# Patient Record
Sex: MALE | Race: WHITE | NOT HISPANIC OR LATINO | Employment: OTHER | URBAN - METROPOLITAN AREA
[De-identification: names, ages, dates, MRNs, and addresses within clinical notes are randomized per-mention and may not be internally consistent; named-entity substitution may affect disease eponyms.]

---

## 2022-06-09 ENCOUNTER — HOSPITAL ENCOUNTER (OUTPATIENT)
Dept: LAB | Facility: MEDICAL CENTER | Age: 77
End: 2022-06-09
Attending: INTERNAL MEDICINE
Payer: MEDICARE

## 2022-06-09 LAB
CFT BLD TEG: 9 MIN (ref 4.6–9.1)
CFT P HPASE BLD TEG: 10.7 MIN (ref 4.3–8.3)
CLOT ANGLE BLD TEG: 71.9 DEGREES (ref 63–78)
CT.EXTRINSIC BLD ROTEM: 1.3 MIN (ref 0.8–2.1)
FACT IX ACT/NOR PPP: 109 % (ref 75–125)
INHIBITOR INDICATED 1863: YES
MCF BLD TEG: 60.6 MM (ref 52–69)
MCF.PLATELET INHIB BLD ROTEM: 22.9 MM (ref 15–32)
PATH REV: NORMAL
PATH REV: NORMAL
TEG ALGORITHM TGALG: ABNORMAL

## 2022-06-09 PROCEDURE — 80503 PATH CLIN CONSLTJ SF 5-20: CPT | Mod: XU

## 2022-06-09 PROCEDURE — 36415 COLL VENOUS BLD VENIPUNCTURE: CPT

## 2022-06-09 PROCEDURE — 85240 CLOT FACTOR VIII AHG 1 STAGE: CPT

## 2022-06-09 PROCEDURE — 85384 FIBRINOGEN ACTIVITY: CPT

## 2022-06-09 PROCEDURE — 85610 PROTHROMBIN TIME: CPT | Mod: 91

## 2022-06-09 PROCEDURE — 85670 THROMBIN TIME PLASMA: CPT

## 2022-06-09 PROCEDURE — 85347 COAGULATION TIME ACTIVATED: CPT

## 2022-06-09 PROCEDURE — 85732 THROMBOPLASTIN TIME PARTIAL: CPT

## 2022-06-09 PROCEDURE — 85597 PHOSPHOLIPID PLTLT NEUTRALIZ: CPT

## 2022-06-09 PROCEDURE — 85250 CLOT FACTOR IX PTC/CHRSTMAS: CPT

## 2022-06-09 PROCEDURE — 85576 BLOOD PLATELET AGGREGATION: CPT

## 2022-06-09 PROCEDURE — 85613 RUSSELL VIPER VENOM DILUTED: CPT | Mod: 91

## 2022-06-10 LAB
FACT VIII ACT/NOR PPP: 82 % (ref 45–145)
INHIBITOR INDICATED 1863: YES
PATH REV: NORMAL
PATH REV: NORMAL

## 2022-06-10 PROCEDURE — 80503 PATH CLIN CONSLTJ SF 5-20: CPT

## 2022-06-15 LAB
APTT IMM NP PPP: 77 SEC (ref 32–48)
CONFIRM APTT STACLOT: ABNORMAL
DRVVT SCREEN TO CONFIRM RATIO: POSITIVE RATIO
LA 3 SCREEN W REFLEX-IMP: ABNORMAL
LA NT PLATELET PPP: POSITIVE S
MIXING DRVVT: 82 SEC (ref 33–44)
PROTHROMBIN TIME: 13.5 SEC (ref 12–15.5)
PT P HEP NEUT PPP: ABNORMAL SEC (ref 32–48)
REPTILASE TIME: ABNORMAL SEC
SCREEN APTT: 105 SEC (ref 32–48)
SCREEN DRVVT: 122 SEC (ref 33–44)
THROMBIN TIME: 16.8 SEC (ref 14.7–19.5)

## 2022-06-17 LAB
APTT 1H NP PPP: 50.1 SEC (ref 24.7–36)
APTT 1H P INC POOL PPP: 27.9 SEC (ref 24.7–36)
APTT 1H P INC PPP: 64.9 SEC (ref 24.7–36)
APTT IMM NP PPP: 49.2 SEC (ref 24.7–36)
APTT POOL PPP: 28.1 SEC (ref 24.7–36)
APTT PPP: 60.1 SEC (ref 24.7–36)

## 2024-11-20 ENCOUNTER — HOSPITAL ENCOUNTER (OUTPATIENT)
Facility: MEDICAL CENTER | Age: 79
End: 2024-11-20
Payer: COMMERCIAL

## 2024-11-20 ENCOUNTER — HOSPITAL ENCOUNTER (OUTPATIENT)
Dept: LAB | Facility: MEDICAL CENTER | Age: 79
End: 2024-11-20
Payer: MEDICARE

## 2024-11-20 ENCOUNTER — OFFICE VISIT (OUTPATIENT)
Dept: MEDICAL GROUP | Facility: MEDICAL CENTER | Age: 79
End: 2024-11-20
Payer: MEDICARE

## 2024-11-20 VITALS
SYSTOLIC BLOOD PRESSURE: 122 MMHG | HEART RATE: 83 BPM | WEIGHT: 209.2 LBS | HEIGHT: 70 IN | BODY MASS INDEX: 29.95 KG/M2 | TEMPERATURE: 97.8 F | OXYGEN SATURATION: 98 % | DIASTOLIC BLOOD PRESSURE: 60 MMHG

## 2024-11-20 DIAGNOSIS — G47.00 INSOMNIA, UNSPECIFIED TYPE: ICD-10-CM

## 2024-11-20 DIAGNOSIS — M25.551 CHRONIC PAIN OF BOTH HIPS: ICD-10-CM

## 2024-11-20 DIAGNOSIS — E11.65 TYPE 2 DIABETES MELLITUS WITH HYPERGLYCEMIA, WITHOUT LONG-TERM CURRENT USE OF INSULIN (HCC): ICD-10-CM

## 2024-11-20 DIAGNOSIS — H40.9 GLAUCOMA OF BOTH EYES, UNSPECIFIED GLAUCOMA TYPE: ICD-10-CM

## 2024-11-20 DIAGNOSIS — E78.5 DYSLIPIDEMIA: ICD-10-CM

## 2024-11-20 DIAGNOSIS — G62.9 NEUROPATHY: ICD-10-CM

## 2024-11-20 DIAGNOSIS — E66.9 OBESITY (BMI 30-39.9): ICD-10-CM

## 2024-11-20 DIAGNOSIS — N18.32 STAGE 3B CHRONIC KIDNEY DISEASE: ICD-10-CM

## 2024-11-20 DIAGNOSIS — Z74.09 IMPAIRED MOBILITY: ICD-10-CM

## 2024-11-20 DIAGNOSIS — Z76.89 ENCOUNTER TO ESTABLISH CARE: ICD-10-CM

## 2024-11-20 DIAGNOSIS — G89.29 BILATERAL CHRONIC KNEE PAIN: ICD-10-CM

## 2024-11-20 DIAGNOSIS — F43.10 PTSD (POST-TRAUMATIC STRESS DISORDER): ICD-10-CM

## 2024-11-20 DIAGNOSIS — M25.552 CHRONIC PAIN OF BOTH HIPS: ICD-10-CM

## 2024-11-20 DIAGNOSIS — Z85.820 HISTORY OF MELANOMA: ICD-10-CM

## 2024-11-20 DIAGNOSIS — G89.29 CHRONIC PAIN OF BOTH HIPS: ICD-10-CM

## 2024-11-20 DIAGNOSIS — I10 PRIMARY HYPERTENSION: ICD-10-CM

## 2024-11-20 DIAGNOSIS — M25.562 BILATERAL CHRONIC KNEE PAIN: ICD-10-CM

## 2024-11-20 DIAGNOSIS — F51.5 NIGHTMARES: ICD-10-CM

## 2024-11-20 DIAGNOSIS — M25.561 BILATERAL CHRONIC KNEE PAIN: ICD-10-CM

## 2024-11-20 DIAGNOSIS — F33.0 MILD EPISODE OF RECURRENT MAJOR DEPRESSIVE DISORDER (HCC): ICD-10-CM

## 2024-11-20 DIAGNOSIS — R01.1 MURMUR, CARDIAC: ICD-10-CM

## 2024-11-20 DIAGNOSIS — M19.90 ARTHRITIS: ICD-10-CM

## 2024-11-20 PROBLEM — M48.062 LUMBAR STENOSIS WITH NEUROGENIC CLAUDICATION: Status: ACTIVE | Noted: 2022-11-07

## 2024-11-20 PROBLEM — M54.42 CHRONIC BILATERAL LOW BACK PAIN WITH BILATERAL SCIATICA: Status: ACTIVE | Noted: 2022-05-26

## 2024-11-20 PROBLEM — M54.41 CHRONIC BILATERAL LOW BACK PAIN WITH BILATERAL SCIATICA: Status: ACTIVE | Noted: 2022-05-26

## 2024-11-20 PROBLEM — C43.9 MELANOMA OF SKIN (HCC): Status: ACTIVE | Noted: 2023-05-03

## 2024-11-20 LAB
ALBUMIN SERPL BCP-MCNC: 4.1 G/DL (ref 3.2–4.9)
ALBUMIN/GLOB SERPL: 1.3 G/DL
ALP SERPL-CCNC: 72 U/L (ref 30–99)
ALT SERPL-CCNC: 8 U/L (ref 2–50)
ANION GAP SERPL CALC-SCNC: 15 MMOL/L (ref 7–16)
AST SERPL-CCNC: 18 U/L (ref 12–45)
BILIRUB SERPL-MCNC: 0.6 MG/DL (ref 0.1–1.5)
BUN SERPL-MCNC: 38 MG/DL (ref 8–22)
CALCIUM ALBUM COR SERPL-MCNC: 9.7 MG/DL (ref 8.5–10.5)
CALCIUM SERPL-MCNC: 9.8 MG/DL (ref 8.5–10.5)
CHLORIDE SERPL-SCNC: 105 MMOL/L (ref 96–112)
CHOLEST SERPL-MCNC: 139 MG/DL (ref 100–199)
CO2 SERPL-SCNC: 20 MMOL/L (ref 20–33)
CREAT SERPL-MCNC: 1.92 MG/DL (ref 0.5–1.4)
ERYTHROCYTE [DISTWIDTH] IN BLOOD BY AUTOMATED COUNT: 51.3 FL (ref 35.9–50)
GFR SERPLBLD CREATININE-BSD FMLA CKD-EPI: 35 ML/MIN/1.73 M 2
GLOBULIN SER CALC-MCNC: 3.1 G/DL (ref 1.9–3.5)
GLUCOSE SERPL-MCNC: 60 MG/DL (ref 65–99)
HBA1C MFR BLD: 5.4 % (ref ?–5.8)
HCT VFR BLD AUTO: 39.2 % (ref 42–52)
HDLC SERPL-MCNC: 44 MG/DL
HGB BLD-MCNC: 12.8 G/DL (ref 14–18)
LDLC SERPL CALC-MCNC: 67 MG/DL
MCH RBC QN AUTO: 29 PG (ref 27–33)
MCHC RBC AUTO-ENTMCNC: 32.7 G/DL (ref 32.3–36.5)
MCV RBC AUTO: 88.9 FL (ref 81.4–97.8)
PLATELET # BLD AUTO: 276 K/UL (ref 164–446)
PMV BLD AUTO: 11.3 FL (ref 9–12.9)
POCT INT CON NEG: NEGATIVE
POCT INT CON POS: POSITIVE
POTASSIUM SERPL-SCNC: 4.3 MMOL/L (ref 3.6–5.5)
PROT SERPL-MCNC: 7.2 G/DL (ref 6–8.2)
RBC # BLD AUTO: 4.41 M/UL (ref 4.7–6.1)
SODIUM SERPL-SCNC: 140 MMOL/L (ref 135–145)
TRIGL SERPL-MCNC: 141 MG/DL (ref 0–149)
TSH SERPL DL<=0.005 MIU/L-ACNC: 3.56 UIU/ML (ref 0.38–5.33)
WBC # BLD AUTO: 9.5 K/UL (ref 4.8–10.8)

## 2024-11-20 PROCEDURE — 80061 LIPID PANEL: CPT

## 2024-11-20 PROCEDURE — 84443 ASSAY THYROID STIM HORMONE: CPT

## 2024-11-20 PROCEDURE — 83036 HEMOGLOBIN GLYCOSYLATED A1C: CPT

## 2024-11-20 PROCEDURE — 85027 COMPLETE CBC AUTOMATED: CPT

## 2024-11-20 PROCEDURE — 99214 OFFICE O/P EST MOD 30 MIN: CPT

## 2024-11-20 PROCEDURE — 82570 ASSAY OF URINE CREATININE: CPT

## 2024-11-20 PROCEDURE — 80053 COMPREHEN METABOLIC PANEL: CPT

## 2024-11-20 PROCEDURE — 36415 COLL VENOUS BLD VENIPUNCTURE: CPT

## 2024-11-20 PROCEDURE — 3078F DIAST BP <80 MM HG: CPT

## 2024-11-20 PROCEDURE — 92250 FUNDUS PHOTOGRAPHY W/I&R: CPT | Mod: 26

## 2024-11-20 PROCEDURE — 82043 UR ALBUMIN QUANTITATIVE: CPT

## 2024-11-20 PROCEDURE — 3074F SYST BP LT 130 MM HG: CPT

## 2024-11-20 RX ORDER — GLIPIZIDE 5 MG/1
10 TABLET ORAL 2 TIMES DAILY
Qty: 360 TABLET | Refills: 3 | Status: SHIPPED | OUTPATIENT
Start: 2024-11-20 | End: 2025-11-20

## 2024-11-20 RX ORDER — PRAZOSIN HYDROCHLORIDE 1 MG/1
CAPSULE ORAL
COMMUNITY
Start: 2024-09-09 | End: 2024-11-20 | Stop reason: SDUPTHER

## 2024-11-20 RX ORDER — TRAZODONE HYDROCHLORIDE 150 MG/1
TABLET ORAL
COMMUNITY
Start: 2024-10-10 | End: 2024-11-20 | Stop reason: SDUPTHER

## 2024-11-20 RX ORDER — GLIPIZIDE 5 MG/1
TABLET ORAL
COMMUNITY
Start: 2024-10-10 | End: 2024-11-20 | Stop reason: SDUPTHER

## 2024-11-20 RX ORDER — TRAZODONE HYDROCHLORIDE 150 MG/1
150 TABLET ORAL NIGHTLY
Qty: 90 TABLET | Refills: 3 | Status: SHIPPED | OUTPATIENT
Start: 2024-11-20 | End: 2025-11-20

## 2024-11-20 RX ORDER — PRAVASTATIN SODIUM 20 MG
TABLET ORAL
COMMUNITY
Start: 2024-10-10 | End: 2024-11-20 | Stop reason: SDUPTHER

## 2024-11-20 RX ORDER — PRAVASTATIN SODIUM 20 MG
20 TABLET ORAL NIGHTLY
Qty: 90 TABLET | Refills: 3 | Status: SHIPPED | OUTPATIENT
Start: 2024-11-20 | End: 2025-11-20

## 2024-11-20 RX ORDER — GABAPENTIN 300 MG/1
300 CAPSULE ORAL NIGHTLY
Qty: 90 CAPSULE | Refills: 3 | Status: SHIPPED | OUTPATIENT
Start: 2024-11-20 | End: 2025-11-20

## 2024-11-20 RX ORDER — PRAZOSIN HYDROCHLORIDE 1 MG/1
1 CAPSULE ORAL NIGHTLY
Qty: 90 CAPSULE | Refills: 3 | Status: SHIPPED | OUTPATIENT
Start: 2024-11-20 | End: 2025-11-20

## 2024-11-20 RX ORDER — LISINOPRIL AND HYDROCHLOROTHIAZIDE 12.5; 2 MG/1; MG/1
2 TABLET ORAL DAILY
Qty: 180 TABLET | Refills: 3 | Status: SHIPPED | OUTPATIENT
Start: 2024-11-20 | End: 2025-11-20

## 2024-11-20 RX ORDER — LISINOPRIL AND HYDROCHLOROTHIAZIDE 12.5; 2 MG/1; MG/1
TABLET ORAL
COMMUNITY
Start: 2024-10-10 | End: 2024-11-20 | Stop reason: SDUPTHER

## 2024-11-20 RX ORDER — GABAPENTIN 300 MG/1
300 CAPSULE ORAL NIGHTLY
COMMUNITY
Start: 2024-06-10 | End: 2024-11-20 | Stop reason: SDUPTHER

## 2024-11-20 ASSESSMENT — PATIENT HEALTH QUESTIONNAIRE - PHQ9
4. FEELING TIRED OR HAVING LITTLE ENERGY: NEARLY EVERY DAY
SUM OF ALL RESPONSES TO PHQ QUESTIONS 1-9: 7
5. POOR APPETITE OR OVEREATING: NOT AT ALL
9. THOUGHTS THAT YOU WOULD BE BETTER OFF DEAD, OR OF HURTING YOURSELF: NOT AT ALL
7. TROUBLE CONCENTRATING ON THINGS, SUCH AS READING THE NEWSPAPER OR WATCHING TELEVISION: SEVERAL DAYS
8. MOVING OR SPEAKING SO SLOWLY THAT OTHER PEOPLE COULD HAVE NOTICED. OR THE OPPOSITE, BEING SO FIGETY OR RESTLESS THAT YOU HAVE BEEN MOVING AROUND A LOT MORE THAN USUAL: NOT AT ALL
3. TROUBLE FALLING OR STAYING ASLEEP OR SLEEPING TOO MUCH: NEARLY EVERY DAY
2. FEELING DOWN, DEPRESSED, IRRITABLE, OR HOPELESS: NOT AT ALL
CLINICAL INTERPRETATION OF PHQ2 SCORE: 0
6. FEELING BAD ABOUT YOURSELF - OR THAT YOU ARE A FAILURE OR HAVE LET YOURSELF OR YOUR FAMILY DOWN: NOT AL ALL
1. LITTLE INTEREST OR PLEASURE IN DOING THINGS: NOT AT ALL
SUM OF ALL RESPONSES TO PHQ9 QUESTIONS 1 AND 2: 0

## 2024-11-20 ASSESSMENT — ENCOUNTER SYMPTOMS
FEVER: 0
COUGH: 0
PALPITATIONS: 0
ORTHOPNEA: 0
SHORTNESS OF BREATH: 0
CHILLS: 0
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
## 2024-11-21 LAB
CREAT UR-MCNC: 107.49 MG/DL
MICROALBUMIN UR-MCNC: <1.2 MG/DL
MICROALBUMIN/CREAT UR: NORMAL MG/G (ref 0–30)

## 2024-11-25 ENCOUNTER — TELEPHONE (OUTPATIENT)
Dept: HEALTH INFORMATION MANAGEMENT | Facility: OTHER | Age: 79
End: 2024-11-25
Payer: COMMERCIAL

## 2024-12-05 ENCOUNTER — APPOINTMENT (OUTPATIENT)
Dept: PHYSICAL MEDICINE AND REHAB | Facility: MEDICAL CENTER | Age: 79
End: 2024-12-05
Payer: COMMERCIAL

## 2024-12-05 DIAGNOSIS — M25.512 CHRONIC PAIN OF BOTH SHOULDERS: ICD-10-CM

## 2024-12-05 DIAGNOSIS — M17.12 PRIMARY OSTEOARTHRITIS OF LEFT KNEE: Primary | ICD-10-CM

## 2024-12-05 DIAGNOSIS — M25.552 BILATERAL HIP PAIN: ICD-10-CM

## 2024-12-05 DIAGNOSIS — M25.50 POLYARTHRALGIA: ICD-10-CM

## 2024-12-05 DIAGNOSIS — M25.562 CHRONIC PAIN OF LEFT KNEE: ICD-10-CM

## 2024-12-05 DIAGNOSIS — M25.511 CHRONIC PAIN OF BOTH SHOULDERS: ICD-10-CM

## 2024-12-05 DIAGNOSIS — N18.32 STAGE 3B CHRONIC KIDNEY DISEASE: ICD-10-CM

## 2024-12-05 DIAGNOSIS — G89.29 CHRONIC PAIN OF BOTH SHOULDERS: ICD-10-CM

## 2024-12-05 DIAGNOSIS — G89.29 CHRONIC PAIN OF BOTH KNEES: ICD-10-CM

## 2024-12-05 DIAGNOSIS — M25.562 CHRONIC PAIN OF BOTH KNEES: ICD-10-CM

## 2024-12-05 DIAGNOSIS — M79.18 MYOFASCIAL PAIN: ICD-10-CM

## 2024-12-05 DIAGNOSIS — E11.65 TYPE 2 DIABETES MELLITUS WITH HYPERGLYCEMIA, WITHOUT LONG-TERM CURRENT USE OF INSULIN (HCC): ICD-10-CM

## 2024-12-05 DIAGNOSIS — M25.551 BILATERAL HIP PAIN: ICD-10-CM

## 2024-12-05 DIAGNOSIS — M25.561 CHRONIC PAIN OF BOTH KNEES: ICD-10-CM

## 2024-12-05 DIAGNOSIS — G89.29 CHRONIC PAIN OF LEFT KNEE: ICD-10-CM

## 2024-12-19 VITALS
DIASTOLIC BLOOD PRESSURE: 75 MMHG | TEMPERATURE: 97.9 F | HEIGHT: 70 IN | BODY MASS INDEX: 30.93 KG/M2 | WEIGHT: 216.05 LBS | SYSTOLIC BLOOD PRESSURE: 150 MMHG | HEART RATE: 82 BPM | OXYGEN SATURATION: 95 %

## 2024-12-19 PROCEDURE — 99204 OFFICE O/P NEW MOD 45 MIN: CPT | Performed by: STUDENT IN AN ORGANIZED HEALTH CARE EDUCATION/TRAINING PROGRAM

## 2024-12-19 PROCEDURE — 3078F DIAST BP <80 MM HG: CPT | Performed by: STUDENT IN AN ORGANIZED HEALTH CARE EDUCATION/TRAINING PROGRAM

## 2024-12-19 PROCEDURE — 1125F AMNT PAIN NOTED PAIN PRSNT: CPT | Performed by: STUDENT IN AN ORGANIZED HEALTH CARE EDUCATION/TRAINING PROGRAM

## 2024-12-19 PROCEDURE — 3077F SYST BP >= 140 MM HG: CPT | Performed by: STUDENT IN AN ORGANIZED HEALTH CARE EDUCATION/TRAINING PROGRAM

## 2024-12-19 RX ORDER — CYCLOBENZAPRINE HCL 5 MG
2.5 TABLET ORAL 3 TIMES DAILY PRN
Qty: 90 TABLET | Refills: 1 | Status: SHIPPED | OUTPATIENT
Start: 2024-12-19

## 2024-12-19 ASSESSMENT — PAIN SCALES - GENERAL: PAINLEVEL_OUTOF10: 8=MODERATE-SEVERE PAIN

## 2024-12-19 ASSESSMENT — PATIENT HEALTH QUESTIONNAIRE - PHQ9
5. POOR APPETITE OR OVEREATING: 1 - SEVERAL DAYS
CLINICAL INTERPRETATION OF PHQ2 SCORE: 3
SUM OF ALL RESPONSES TO PHQ QUESTIONS 1-9: 16

## 2024-12-19 ASSESSMENT — FIBROSIS 4 INDEX: FIB4 SCORE: 1.82

## 2024-12-19 NOTE — PATIENT INSTRUCTIONS
Overview    What is a genicular nerve block?  A genicular nerve block is an injection of medication close to certain nerves in your knee to provide temporary pain relief. Some injections provide prolonged pain relief. An injection of steroid medication combined with local anesthetic that may help with pain.    Your knee joints function with the help of several branches of the following nerves:    Femoral nerve.  Common peroneal nerve.  Saphenous nerve.  Tibial nerve.  Obturator nerve.  These branches around your knee joint are known as genicular nerves. “Genicular” means “relating to the knee.” Genicular nerves provide sensory innervation to your knee and include the:    Superolateral genicular nerve (SLGN).  Superomedial genicular nerve (SMGN).  Inferomedial genicular nerve (IMGN).  Inferolateral genicular nerve (ILGN).  Healthcare providers can safely target all of these nerves for a genicular nerve block except for the ILGN. This is because the ILGN is too close to your peroneal nerve.    What is a genicular nerve block used for?  A genicular nerve block has two main purposes:    To alleviate chronic knee pain: If you have chronic knee pain that hasn’t responded to conservative treatments, like physical therapy, NSAIDs or steroid knee joint injections, your healthcare provider may recommend a genicular nerve block. It may provide pain relief. Chronic knee pain can have several possible causes, such as injury and knee osteoarthritis.  To alleviate moderate to severe postoperative knee pain: If you’re having a knee surgery, such as a total knee arthroplasty (knee replacement), your anesthesiologist or surgeon may give you a genicular nerve block before the surgery to manage pain after the surgery. They use this in addition to general anesthesia and other peripheral nerve blocks.    Procedure Details  How should I prepare for a genicular nerve block?  You usually don’t have to do anything special to prepare for a  genicular nerve block.    In some cases, your healthcare provider may recommend sedation for the procedure. If you’re receiving sedation, you’ll need to fast for six to eight hours before it. You’ll also need someone else to drive you home after the procedure.    In any case, your healthcare provider will let you know what to do. Be sure to follow their instructions. Don’t hesitate to ask questions.    What happens during a genicular nerve block?  Healthcare providers typically perform genicular nerve blocks for pain management in an outpatient setting. This means you’re not admitted to a hospital for the procedure and can go home shortly after it.    In general, you can expect the following when you receive a genicular nerve block:    You’ll lie on your back on a procedure table. A provider will put a small pillow under your affected knee to prop it up slightly.  You may receive a mild sedative through an IV line in your arm to help you relax.  The provider will clean the skin on your knee with an antiseptic solution. They’ll give you an injection of a local anesthetic to numb the area where you’ll receive the nerve block. You may still feel a pinch or some discomfort as the needle enters your skin.  The provider may use imaging guidance, such as ultrasound or fluoroscopy, to locate the exact spot where the injection needs to go. They’ll then inject the medication as close to the affected nerve(s) as possible.  After the procedure, you’ll rest until the medication takes effect.  The procedure takes about five to 10 minutes to complete.    What happens after a genicular nerve block?  After the injection, you’ll rest for 15 to 30 minutes to let the medication take effect. A nurse will also observe you during this time to make sure you don’t have any unexpected side effects. You’ll then be able to go home.    Risks / Benefits  What are the benefits of a genicular nerve block?  Potential benefits of a genicular nerve  block include:    Temporary or permanent pain relief, which may help you function better day to day.  Temporary or permanent reduction of inflammation in the affected nerves, which may help them heal.  Temporary pain relief after knee surgery.  A pain relief option for people who can’t undergo knee surgery due to health issues.  Some people may feel relief in a few hours, or it may take a couple of weeks for the steroid to take effect. It’s important to note that not everyone experiences pain relief from nerve blocks. You may need to try other treatment options if this is the case.    If you experience effective pain relief following a genicular nerve block, your healthcare provider may recommend genicular nerve ablation (GNA) to treat your knee pain. GNAs typically provide pain relief for about 18 months.    What are the risks or possible complications of a genicular nerve block?  You may have some soreness and slight bruising at the site of the injection, but this is usually mild and should go away within a couple of days.    Complications of genicular nerve blocks are rare. But, in general, risks and complications of nerve blocks include:    Infection at the injection site.  Bleeding at the injection site.  Accidental delivery of the medication into your bloodstream.  Accidental delivery of the medication into your knee joint.  Accidental delivery of the medication to your common peroneal nerve, which could cause foot drop.  Recovery and [place]  Can you walk after a genicular nerve block?  Yes, you should be able to walk out of the room after the genicular nerve block injection. It’s a good idea to take it easy for the rest of the day, but you can return to your normal activities.    How long does a genicular nerve block last?  Pain relief from a genicular nerve block can vary significantly. It may last a few days, several weeks, months or even years. But, on average, it lasts about three months. Each person  responds differently. Some people don’t experience any pain relief.    When To Call the Doctor  When should I call my healthcare provider?  Contact your healthcare provider immediately if you experience any new symptoms or complications from the genicular nerve block, such as an infection or nerve issues like burning pain, weakness or tingling.    A note from [facility]    Genicular nerve blocks can help treat chronic pain in your knee. But the results can vary considerably from person to person. If you’re feeling anxious about receiving a genicular nerve block injection, don’t hesitate to ask your healthcare provider about it and the procedure. They can answer any questions you may have.

## 2024-12-19 NOTE — PROGRESS NOTES
"NEW PATIENT VISIT     Interventional Spine and Pain  Physiatry (Physical Medicine and Rehabilitation)     Date of Service: See Epic  Chief Complaint:   Chief Complaint   Patient presents with    New Patient     Hip pain      Referring Provider: Dee Hart A.*   Patient Name: Venkatesh Aldrich   : 1945   MRN: [id number]     History:     HPI:     Venkatesh Aldrich is a 79 y.o. male with history of T2DM, CKD3, HTN, BUTCH, L4-5 fusion in  who presents to clinic for evaluation of multiple sites of pain. Patient reports greater than 20 year history of pain in all joints. Pain is at least 8 out of 10 in intensity at all times. Pain is the worst in the left knee but he also reports significant pain in the shoulders, hips, hands, and feet. He was recently told he is auto fusing his neck and has been doing regular neck stretches. He has tried higher doses of the gabapentin but it makes him too fatigued to function. Per PCP note, patient has tried \"ibuprofen, hydrocodone, Norco, oxycodone, and OxyContin, but none have been effective.\" He has had knee injections done previously which were not beneficial. He reports he has been getting weaker over time, and is significantly limited in his ability to walk due to pain and weakness. He denies any falls in the last year. He did report that he had improvement in his back and leg pain after his lumbar surgery but unfortunately that seemed to uncover multiple other areas of pain.      Red Flags ROS:   Fever, Chills: Denies  Involuntary Weight Loss: Denies  Bladder Incontinence: Urgency with intermittent incontinence  Bowel Incontinence: Urgency with intermittent incontinence       Medical Records Review:  I reviewed the note from the referring provider Dee Hart A.* including the note dated 24 where they discussed arthritis and neuropathy.       PMHx:   Past Medical History:   Diagnosis Date    Type 2 diabetes mellitus (HCC)        Current " Outpatient Medications on File Prior to Visit   Medication Sig Dispense Refill    traZODone (DESYREL) 150 MG Tab Take 1 Tablet by mouth every evening. 90 Tablet 3    prazosin (MINIPRESS) 1 MG Cap Take 1 Capsule by mouth every evening. 90 Capsule 3    pravastatin (PRAVACHOL) 20 MG Tab Take 1 Tablet by mouth every evening. 90 Tablet 3    metFORMIN (GLUCOPHAGE) 500 MG Tab Take 1 Tablet by mouth 2 times a day with meals. 180 Tablet 3    glipiZIDE (GLUCOTROL) 5 MG Tab Take 2 Tablets by mouth 2 times a day. 360 Tablet 3    gabapentin (NEURONTIN) 300 MG Cap Take 1 Capsule by mouth every evening. 90 Capsule 3    lisinopril-hydrochlorothiazide (PRINZIDE) 20-12.5 MG per tablet Take 2 Tablets by mouth every day. (Patient not taking: Reported on 12/19/2024) 180 Tablet 3     No current facility-administered medications on file prior to visit.        PSHx:   Past Surgical History:   Procedure Laterality Date    LUMBAR FUSION POSTERIOR PERCUTANEOUS      L4-L5       Family history   History reviewed. No pertinent family history.      Medications: Reviewed on [facility]  Outpatient Medications Marked as Taking for the 12/19/24 encounter (Office Visit) with Nicky Valdivia M.D.   Medication Sig Dispense Refill    cyclobenzaprine (FLEXERIL) 5 mg tablet Take 0.5 Tablets by mouth 3 times a day as needed for Mild Pain, Moderate Pain or Muscle Spasms. 90 Tablet 1    traZODone (DESYREL) 150 MG Tab Take 1 Tablet by mouth every evening. 90 Tablet 3    prazosin (MINIPRESS) 1 MG Cap Take 1 Capsule by mouth every evening. 90 Capsule 3    pravastatin (PRAVACHOL) 20 MG Tab Take 1 Tablet by mouth every evening. 90 Tablet 3    metFORMIN (GLUCOPHAGE) 500 MG Tab Take 1 Tablet by mouth 2 times a day with meals. 180 Tablet 3    glipiZIDE (GLUCOTROL) 5 MG Tab Take 2 Tablets by mouth 2 times a day. 360 Tablet 3    gabapentin (NEURONTIN) 300 MG Cap Take 1 Capsule by mouth every evening. 90 Capsule 3        Allergies:   Allergies   Allergen Reactions    Nsaids  "Unspecified     Due to renal disease       Social Hx:   Social History     Socioeconomic History    Marital status: Single     Spouse name: Not on file    Number of children: Not on file    Years of education: Not on file    Highest education level: Not on file   Occupational History    Not on file   Tobacco Use    Smoking status: Never    Smokeless tobacco: Never   Vaping Use    Vaping status: Never Used   Substance and Sexual Activity    Alcohol use: Not Currently    Drug use: Never    Sexual activity: Not on file   Other Topics Concern    Not on file   Social History Narrative    Not on file     Social Drivers of Health     Financial Resource Strain: Not on file   Food Insecurity: Not on file   Transportation Needs: Not on file   Physical Activity: Not on file   Stress: Not on file   Social Connections: Not on file   Intimate Partner Violence: Not on file   Housing Stability: Not on file          EXAMINATION     Physical Exam:   Vitals: BP (!) 150/75 (BP Location: Right arm, Patient Position: Sitting, BP Cuff Size: Adult)   Pulse 82   Temp 36.6 °C (97.9 °F) (Temporal)   Ht 1.778 m (5' 10\")   Wt 98 kg (216 lb 0.8 oz)   SpO2 95%     Constitutional:   Body Habitus: Body mass index is 31 kg/m².  Cooperation: Fully cooperates with exam  Appearance: Well-groomed, well-nourished  Eyes: No scleral icterus to suggest severe liver disease, no proptosis to suggest severe hyperthyroid  ENT: No obvious auditory deficits, no obvious tongue lesions, tongue midline, no facial droop   Respiratory:  Breathing comfortable on room air, no audible wheezing  Cardiovascular: Skin appears well-perfused  Psychiatric: Appropriate affect  Gait: significantly antalgic gait without knee flexion, ambulates with single point cane. Significant difficulty with rising from sitting to standing.    Neurologic:  Strength:    Bilateral UE 4+/5 in shoulder abductors, elbow extensors and flexors, wrist extensors, finger abductors, and finger " flexors (pain)  Bilateral LE 4/5 in hip flexors, 5/5 in knee extensors and flexors, ankle dorsiflexors and plantarflexors  Reflexes: 2+ in bilateral patella and achilles, brachioradialis, biceps, triceps. Negative Slade's bilaterally.  Sensation: grossly intact bilaterally dermatomes L3-S1, C5-T1      MEDICAL DECISION MAKING    Medical Records Review: See under HPI section    DATA    Labs:   Lab Results   Component Value Date/Time    SODIUM 140 11/20/2024 10:30 AM    POTASSIUM 4.3 11/20/2024 10:30 AM    CHLORIDE 105 11/20/2024 10:30 AM    CO2 20 11/20/2024 10:30 AM    ANION 15.0 11/20/2024 10:30 AM    GLUCOSE 60 (L) 11/20/2024 10:30 AM    BUN 38 (H) 11/20/2024 10:30 AM    CREATININE 1.92 (H) 11/20/2024 10:30 AM    CALCIUM 9.8 11/20/2024 10:30 AM    ASTSGOT 18 11/20/2024 10:30 AM    ALTSGPT 8 11/20/2024 10:30 AM    TBILIRUBIN 0.6 11/20/2024 10:30 AM    ALBUMIN 4.1 11/20/2024 10:30 AM    TOTPROTEIN 7.2 11/20/2024 10:30 AM    GLOBULIN 3.1 11/20/2024 10:30 AM    AGRATIO 1.3 11/20/2024 10:30 AM       Lab Results   Component Value Date/Time    WBC 9.5 11/20/2024 10:30 AM    RBC 4.41 (L) 11/20/2024 10:30 AM    HEMOGLOBIN 12.8 (L) 11/20/2024 10:30 AM    HEMATOCRIT 39.2 (L) 11/20/2024 10:30 AM    MCV 88.9 11/20/2024 10:30 AM    MCH 29.0 11/20/2024 10:30 AM    MCHC 32.7 11/20/2024 10:30 AM    MPV 11.3 11/20/2024 10:30 AM    NEUTSPOLYS 62.4 05/11/2022 10:33 AM    LYMPHOCYTES 22.1 05/11/2022 10:33 AM    MONOCYTES 9.8 05/11/2022 10:33 AM    EOSINOPHILS 4.7 05/11/2022 10:33 AM    BASOPHILS 1.0 05/11/2022 10:33 AM        Lab Results   Component Value Date/Time    HBA1C 5.4 11/20/2024 09:30 AM        Imaging:     IMAGING radiology reads: I reviewed the following radiology reports  X-ray left knee 10/3/23: Findings:     Left knee: Mild narrowing medial joint space compartment. Subcortical   lucency along the weightbearing portion of the medial femoral condyle.   Small marginal osteophytes. Mild narrowing of the lateral    patellofemoral joint. Small osteophytes along the margins of the   patella and trochlear groove.     Moderate size joint effusion.     Impression:   1. Joint effusion.     2. Tricompartmental degenerative changes most marked in the medial   joint space compartment.     3. Possible osteochondral abnormality on the weightbearing portion   medial femoral condyle.                                                         X-ray right knee 10/3/23:  Findings:       Right knee: Mild narrowing medial joint space compartment with small   marginal osteophytes.     Osteophytes along the tibial spines. Mild narrowing lateral   patellofemoral joint with osteophytes along the patella and trochlear   groove. No significant joint effusion.       Impression:     Mild tricompartmental degenerative changes.     KL Osteoarthritis Score: 3        X-ray bilateral hips 10/3/23:  Findings: No acute fracture.     Acetabular joint spaces preserved.     Mild hypertrophic changes superior lateral margin of the right left   acetabulum.     SI joints normal.     Postoperative and degenerative changes lower lumbar spine.     Impression:     Mild degenerative changes acetabular joint spaces, similar to prior   exam.                                                     Diagnosis   Visit Diagnoses     ICD-10-CM   1. Primary osteoarthritis of left knee  M17.12   2. Chronic pain of left knee  M25.562    G89.29   3. Polyarthralgia  M25.50   4. Type 2 diabetes mellitus with hyperglycemia, without long-term current use of insulin (HCC)  E11.65   5. Stage 3b chronic kidney disease  N18.32   6. Myofascial pain  M79.18   7. Bilateral hip pain  M25.551    M25.552   8. Chronic pain of both knees  M25.561    M25.562    G89.29   9. Chronic pain of both shoulders  M25.511    G89.29    M25.512         ASSESSMENT AND PLAN:  Venkatesh Aldrich is a 79 y.o. male who presents to clinic for evaluation of polyarthralgia.     Venkaetsh was seen today for new  patient.    Diagnoses and all orders for this visit:    Primary osteoarthritis of left knee  -     Referral to Pain Clinic  -     cyclobenzaprine (FLEXERIL) 5 mg tablet; Take 0.5 Tablets by mouth 3 times a day as needed for Mild Pain, Moderate Pain or Muscle Spasms.    Chronic pain of left knee    Polyarthralgia  -     cyclobenzaprine (FLEXERIL) 5 mg tablet; Take 0.5 Tablets by mouth 3 times a day as needed for Mild Pain, Moderate Pain or Muscle Spasms.    Type 2 diabetes mellitus with hyperglycemia, without long-term current use of insulin ([facility])    Stage 3b chronic kidney disease    Myofascial pain  -     cyclobenzaprine (FLEXERIL) 5 mg tablet; Take 0.5 Tablets by mouth 3 times a day as needed for Mild Pain, Moderate Pain or Muscle Spasms.    Bilateral hip pain  -     DX-HIP-BILATERAL-WITH PELVIS-2 VIEWS; Future    Chronic pain of both knees  -     DX-KNEES-AP BILATERAL STANDING; Future    Chronic pain of both shoulders  -     DX-SHOULDER 2+ LEFT; Future  -     DX-SHOULDER 2+ RIGHT; Future        Patient presents for evaluation of multiyear history of polyarthralgia with joint pain including the bilateral shoulders, hands, hips, knees, and feet in the setting of multifocal osteoarthritis.  Patient does report that his left knee pain is currently the most bothersome and this pain has been refractory to greater than 2 years of weekly physical therapy as well as multiple intra-articular corticosteroid injections.  Therefore, I have offered patient genicular nerve block to the left knee with possible progression to radiofrequency ablation pending response to block.  We discussed that this is a test injection and risks include but are not limited to bleeding, infection, damage to surrounding structures, patient voiced understanding and wishes to proceed.  I have also ordered updated x-rays of the bilateral shoulders, knees, and hips for consideration of possible focal joint intervention in the future such as nerve block  or intra-articular injection.  From a medication standpoint, patient unfortunately is unable to use NSAID as a result of CKD 3 though this has previously been beneficial for him.  He has also tried multiple prescription medications including duloxetine, Norco, oxycodone, OxyContin, none of which have been beneficial for him.  He has not previously been on muscle relaxant medication, and I do think that there is a myofascial component to his pain so I have prescribed him cyclobenzaprine 2.5 mg to be used up to 3 times daily, we discussed that this medication should first be used at night as it can be sedating.      Physical Therapy: Patient has completed 2+ years of physical therapy without improvement in pain    Diagnostic Workup: As above x-ray's of the bilateral knees, hips, shoulders    Medications: Cyclobenzaprine 2.5 mg to be used up to three times a day as needed, discussed taking this at night first as it can cause sedation    Interventional Treatment: Order placed for left knee genicular nerve blocks    Follow-up: For left knee genicular nerve blocks, then 3-5 days after injection in clinic      Please note that this dictation was created using voice recognition software. I have made every reasonable attempt to correct obvious errors but there may be errors of grammar and content that I may have overlooked prior to finalization of this note.      Nicky Valdivia MD  Physical Medicine and Rehabilitation  Interventional Spine and Sports Physiatry  [facility]       CHELSY Castro.   CC Dee Hart A.*.

## 2025-01-09 ENCOUNTER — HOSPITAL ENCOUNTER (OUTPATIENT)
Dept: RADIOLOGY | Facility: MEDICAL CENTER | Age: 80
End: 2025-01-09
Attending: STUDENT IN AN ORGANIZED HEALTH CARE EDUCATION/TRAINING PROGRAM
Payer: MEDICARE

## 2025-01-09 DIAGNOSIS — M25.551 BILATERAL HIP PAIN: ICD-10-CM

## 2025-01-09 DIAGNOSIS — M25.512 CHRONIC PAIN OF BOTH SHOULDERS: ICD-10-CM

## 2025-01-09 DIAGNOSIS — M25.511 CHRONIC PAIN OF BOTH SHOULDERS: ICD-10-CM

## 2025-01-09 DIAGNOSIS — M25.552 BILATERAL HIP PAIN: ICD-10-CM

## 2025-01-09 DIAGNOSIS — M25.562 CHRONIC PAIN OF BOTH KNEES: ICD-10-CM

## 2025-01-09 DIAGNOSIS — G89.29 CHRONIC PAIN OF BOTH SHOULDERS: ICD-10-CM

## 2025-01-09 DIAGNOSIS — M25.561 CHRONIC PAIN OF BOTH KNEES: ICD-10-CM

## 2025-01-09 DIAGNOSIS — G89.29 CHRONIC PAIN OF BOTH KNEES: ICD-10-CM

## 2025-01-09 PROCEDURE — 73521 X-RAY EXAM HIPS BI 2 VIEWS: CPT

## 2025-01-09 PROCEDURE — 73030 X-RAY EXAM OF SHOULDER: CPT | Mod: RT

## 2025-01-09 PROCEDURE — 73565 X-RAY EXAM OF KNEES: CPT

## 2025-01-22 ENCOUNTER — HOSPITAL ENCOUNTER (OUTPATIENT)
Facility: REHABILITATION | Age: 80
End: 2025-01-22
Attending: PHYSICAL MEDICINE & REHABILITATION | Admitting: PHYSICAL MEDICINE & REHABILITATION
Payer: MEDICARE

## 2025-01-22 ENCOUNTER — APPOINTMENT (OUTPATIENT)
Dept: RADIOLOGY | Facility: REHABILITATION | Age: 80
End: 2025-01-22
Attending: PHYSICAL MEDICINE & REHABILITATION
Payer: MEDICARE

## 2025-01-22 VITALS
TEMPERATURE: 97.2 F | DIASTOLIC BLOOD PRESSURE: 93 MMHG | OXYGEN SATURATION: 99 % | HEART RATE: 91 BPM | RESPIRATION RATE: 16 BRPM | HEIGHT: 70 IN | SYSTOLIC BLOOD PRESSURE: 182 MMHG | BODY MASS INDEX: 30.78 KG/M2 | WEIGHT: 215 LBS

## 2025-01-22 PROCEDURE — 64454 NJX AA&/STRD GNCLR NRV BRNCH: CPT

## 2025-01-22 PROCEDURE — 700101 HCHG RX REV CODE 250

## 2025-01-22 PROCEDURE — 700111 HCHG RX REV CODE 636 W/ 250 OVERRIDE (IP): Mod: JZ

## 2025-01-22 RX ORDER — LIDOCAINE HYDROCHLORIDE 20 MG/ML
INJECTION, SOLUTION EPIDURAL; INFILTRATION; INTRACAUDAL; PERINEURAL
Status: COMPLETED
Start: 2025-01-22 | End: 2025-01-22

## 2025-01-22 RX ORDER — LIDOCAINE HYDROCHLORIDE 10 MG/ML
INJECTION, SOLUTION EPIDURAL; INFILTRATION; INTRACAUDAL; PERINEURAL
Status: COMPLETED
Start: 2025-01-22 | End: 2025-01-22

## 2025-01-22 RX ADMIN — LIDOCAINE HYDROCHLORIDE 10 ML: 20 INJECTION, SOLUTION EPIDURAL; INFILTRATION; INTRACAUDAL; PERINEURAL at 09:24

## 2025-01-22 RX ADMIN — LIDOCAINE HYDROCHLORIDE 10 ML: 10 INJECTION, SOLUTION EPIDURAL; INFILTRATION; INTRACAUDAL; PERINEURAL at 09:24

## 2025-01-22 ASSESSMENT — FIBROSIS 4 INDEX: FIB4 SCORE: 1.82

## 2025-01-22 ASSESSMENT — PAIN DESCRIPTION - PAIN TYPE: TYPE: CHRONIC PAIN

## 2025-01-22 NOTE — OP REPORT
Patient Name: Venkatesh Aldrich 79 y.o. male    MRN: [id number]     Date of Service: 1/22/2025    Physician/s: Shahab rBo MD    Pre-operative Diagnosis: left Knee osteoarthrosis, Knee pain.      Post-operative Diagnosis: left Knee osteoarthrosis, Knee pain.     Procedure: left Knee Genicular Nerve Blocks     Description of procedure:  The risks, benefits, and alternatives of the procedure were reviewed and discussed with the patient.  Written informed consent was freely obtained. A pre-procedural time-out was conducted by the physician verifying patient’s identity, procedure to be performed, procedure site and side, and allergy verification. Appropriate equipment was determined to be in place for the procedure.          The patient's vital signs were carefully monitored before, throughout, and after the procedure.    In the fluoroscopy suite the patient was placed in a supine position, the knee was flexed with a pillow/towels underneath for support, and the skin was prepped and draped in the usual sterile fashion. The fluoroscope was placed over the left knee at an true AP position, and THREE targets for injection were marked superior lateral genicular nerve adjacent to the femoral metaphysis on the lateral aspect of the middle third of the, superior medial genicular nerve adjacent to the femoral  metaphysis on the medial aspect and in the middle third of the lateral view, inferior medial and medial tibial metaphysis on the AP view and the middle third on the lateral view. A 25g spinal needle was then advanced into the superior lateral, superior medial, inferior medial, and superior midline target points for the genicular nerves. The needle tips were then verified by AP and lateral views.  Following negative aspiration, 1mL of 2% lidocaine was then injected at the above levels, and the needles were removed intact after restyleted. The patient's knee was covered with a 4x4 gauze, the area was cleansed with  sterile normal saline, and a dressing was applied.     There were no complications noted, was hemodynamically stable, and tolerated the procedure well.     Immediately postprocedure the patient had a difficult time telling if there was pain relief.  He was given a pain diary and advised to track his pain over the next several hours with different activities and to bring this to the follow-up visit.    Follow-up as scheduled    Shahab Bro MD  Interventional Spine and Pain  Physical Medicine and Rehabilitation  [facility]           CPT  Suprapatellar genicular nerve block, Superomedial genicular nerve block, Inferomedial genicular nerve block [id number]

## 2025-01-22 NOTE — H&P
Physical medicine and rehabilitation preprocedure history & Physical Note    Date  1/22/2025    Primary Care Physician  MIRANDA Bajwa  Pre-Op Diagnosis Codes:      * Primary osteoarthritis of left knee [M17.12]     HPI  This is a 79 y.o. male who presented with chronic knee pain for many years. Failed conservative treatments for greater than 6 months.     See previous notes of Dr. Bro    Past Medical History:   Diagnosis Date    Type 2 diabetes mellitus (HCC)        Past Surgical History:   Procedure Laterality Date    LUMBAR FUSION POSTERIOR PERCUTANEOUS      L4-L5       No current facility-administered medications for this encounter.       Social History     Socioeconomic History    Marital status: Single     Spouse name: Not on file    Number of children: Not on file    Years of education: Not on file    Highest education level: Not on file   Occupational History    Not on file   Tobacco Use    Smoking status: Never    Smokeless tobacco: Never   Vaping Use    Vaping status: Never Used   Substance and Sexual Activity    Alcohol use: Not Currently    Drug use: Never    Sexual activity: Not on file   Other Topics Concern    Not on file   Social History Narrative    Not on file     Social Drivers of Health     Financial Resource Strain: Not on file   Food Insecurity: Not on file   Transportation Needs: Not on file   Physical Activity: Not on file   Stress: Not on file   Social Connections: Not on file   Intimate Partner Violence: Not on file   Housing Stability: Not on file       History reviewed. No pertinent family history.    Allergies  Nsaids    Review of Systems  Comprehensive review of systems was negative       Physical Exam  Constitutional:       General: He is not in acute distress.     Appearance: Normal appearance. He is well-developed. He is not diaphoretic.   Cardiovascular:      Rate and Rhythm: Normal rate.   Pulmonary:      Effort: Pulmonary effort is normal.      Breath  sounds: Normal breath sounds.   Abdominal:      Palpations: Abdomen is soft.   Skin:     General: Skin is warm and dry.      Capillary Refill: Capillary refill takes less than 2 seconds.   Neurological:      Mental Status: He is alert and oriented to person, place, and time.   Psychiatric:         Behavior: Behavior normal.         Thought Content: Thought content normal.         Judgment: Judgment normal.          Vital Signs  Blood Pressure : (!) 159/94   Temperature: 36.2 °C (97.2 °F)   Pulse: 94   Respiration: 18   Pulse Oximetry: 98 %     Vitals reviewed    Labs:                    Radiology:  DX-PORTABLE FLUOROSCOPY < 1 HOUR Reason For Exam: pain    (Results Pending)         Assessment/Plan:  Pre-Op Diagnosis Codes:      * Primary osteoarthritis of left knee [M17.12]  Procedure(s):  LEFT knee genicular nerve block fluoroscopic guided

## 2025-01-22 NOTE — OR SURGEON
Immediate Post OP Note    Pre-Op Diagnosis Codes:      * Primary osteoarthritis of left knee [M17.12]      Post-Op Diagnosis Codes:     * Primary osteoarthritis of left knee [M17.12]      Procedure(s):  LEFT knee genicular nerve block fluoroscopic guided - Wound Class: Clean    Surgeon(s):  Shahab Bro M.D.    Anesthesiologist/Type of Anesthesia:  No anesthesia staff entered./* No anesthesia type entered *    Surgical Staff:  Circulator: Estephania Landa R.N.  Scrub Person: Fouzia Alonzo R.N.  Radiology Technologist: Avinash Lockhart    Specimens removed if any:  * No specimens in log *    Estimated Blood Loss: None    Findings: None    Complications: None        1/22/2025 9:38 AM Shahab Bro M.D.

## 2025-01-22 NOTE — PROGRESS NOTES
0936 Pt arrived to pre-procedure area. Procedure & plan for recovery reviewed, site confirmed, & consent signed. Allergies & current medications verified. Appointed  to be called for DC. Printed discharge instructions discussed & signed. Pt denies taking NSAIDS or anticoagulants in the last 5 days. MD to bedside prior to procedure.     0935 Pt to recovery area s/p genicular nerve block & updates received from procedure RN. Ice packs given for home care.     0942 Pt's ride notified of expected DC time.     1003 DC criteria met. RN assisted pt off unit to appointed .

## 2025-01-23 ENCOUNTER — TELEPHONE (OUTPATIENT)
Dept: PHYSICAL MEDICINE AND REHAB | Facility: MEDICAL CENTER | Age: 80
End: 2025-01-23
Payer: MEDICARE

## 2025-01-23 NOTE — TELEPHONE ENCOUNTER
Called for post-sp check-up. Pt reported the following regarding the procedure site: Left knee genicular nerve block fluoroscopic guided     Change in pain?: Yes    Concerns?: No    Confirmed [facility] appt?: Yes

## 2025-02-10 NOTE — PROGRESS NOTES
Verbal consent was acquired by the patient to use [employer] ambient listening note generation during this visit Yes     FOLLOW-UP PATIENT VISIT    Interventional Spine and Pain  Physiatry (Physical Medicine and Rehabilitation)     Date of Service: 25   Chief Complaint:   Chief Complaint   Patient presents with    Follow-Up     Primary osteoarthritis of left knee        Patient Name: Venkatesh Aldrich   : 1945   MRN: [id number]       PRIOR HISTORY    Please see new patient note by Dr. Valdivia for more details.     Interval History  Patient identification: Venkatesh Aldrich,  1945, with history of T2DM, CKD3, HTN, BUTCH, L4-5 fusion in , who presents today for follow-up of left knee pain.    History of Present Illness  The patient is a 79-year-old male who presents today for follow-up of left knee and right hip pain.    He reports experiencing significant discomfort in his right hip, which he attributes to a pinched nerve. This pain, described as severe, radiates throughout his right groin and necessitates a period of straightening before it subsides. The onset of this pain was sudden around 2 weeks ago, without any preceding fall or injury. He localizes the pain to his groin area, describing it as deep-seated and non-radiating. He reports no associated numbness or tingling in the hip. Despite the pain, he maintains that his right leg remains his stronger limb, although he exercises caution when navigating around furniture at home. He has not had any falls in several years. He has previously been prescribed Percocet following a back fusion procedure, but discontinued its use due to side effects of paranoia and hallucinations. He has also tried Norco, hydrocodone, oxycodone, and OxyContin, but found them ineffective in managing his pain. He has found ibuprofen to be the only effective analgesic, but is advised against its regular use due to his kidney condition (last GFR 35). He has been  informed by his PCP that occasional use of ibuprofen, such as once every 1 to 2 months, is permissible. He reports that Tylenol does not provide any relief.    He underwent a left knee genicular nerve block on 1/22, which unfortunately did not alleviate his pain. In fact, he experienced increased pain the following day, to the point where he was unable to ambulate. He currently reports an inability to fully extend his left leg due to knee pain. He also notes that his left knee remains bent during ambulation. He has received steroid injections in the past, but found them ineffective. He expresses reluctance towards knee replacement surgery. He has previously received a steroid injection in his left hip, but found it unhelpful.     MEDICATIONS  Current: ibuprofen rarely  Past: Percocet, Norco, hydrocodone, oxycodone, OxyContin (all ineffective)    Procedures:  1/22/25: Left knee genicular nerve block, no improvement      PMHx:   Past Medical History:   Diagnosis Date    Type 2 diabetes mellitus (HCC)        PSHx:   Past Surgical History:   Procedure Laterality Date    AL INJ AA/STRD GNCLR NRV BRNCH W/IMG Left 1/22/2025    Procedure: LEFT knee genicular nerve block fluoroscopic guided;  Surgeon: Shahab Bro M.D.;  [facility];  Service: Pain Management    LUMBAR FUSION POSTERIOR PERCUTANEOUS      L4-L5       Family history   History reviewed. No pertinent family history.    Medications:   Outpatient Medications Marked as Taking for the 2/12/25 encounter (Office Visit) with Nicky Valdivia M.D.   Medication Sig Dispense Refill    cyclobenzaprine (FLEXERIL) 5 mg tablet Take 0.5 Tablets by mouth 3 times a day as needed for Mild Pain, Moderate Pain or Muscle Spasms. 90 Tablet 1    traZODone (DESYREL) 150 MG Tab Take 1 Tablet by mouth every evening. 90 Tablet 3    prazosin (MINIPRESS) 1 MG Cap Take 1 Capsule by mouth every evening. 90 Capsule 3    pravastatin (PRAVACHOL) 20 MG Tab Take 1  Tablet by mouth every evening. 90 Tablet 3    metFORMIN (GLUCOPHAGE) 500 MG Tab Take 1 Tablet by mouth 2 times a day with meals. 180 Tablet 3    glipiZIDE (GLUCOTROL) 5 MG Tab Take 2 Tablets by mouth 2 times a day. 360 Tablet 3    gabapentin (NEURONTIN) 300 MG Cap Take 1 Capsule by mouth every evening. 90 Capsule 3        Current Outpatient Medications on File Prior to Visit   Medication Sig Dispense Refill    cyclobenzaprine (FLEXERIL) 5 mg tablet Take 0.5 Tablets by mouth 3 times a day as needed for Mild Pain, Moderate Pain or Muscle Spasms. 90 Tablet 1    traZODone (DESYREL) 150 MG Tab Take 1 Tablet by mouth every evening. 90 Tablet 3    prazosin (MINIPRESS) 1 MG Cap Take 1 Capsule by mouth every evening. 90 Capsule 3    pravastatin (PRAVACHOL) 20 MG Tab Take 1 Tablet by mouth every evening. 90 Tablet 3    metFORMIN (GLUCOPHAGE) 500 MG Tab Take 1 Tablet by mouth 2 times a day with meals. 180 Tablet 3    glipiZIDE (GLUCOTROL) 5 MG Tab Take 2 Tablets by mouth 2 times a day. 360 Tablet 3    gabapentin (NEURONTIN) 300 MG Cap Take 1 Capsule by mouth every evening. 90 Capsule 3    lisinopril-hydrochlorothiazide (PRINZIDE) 20-12.5 MG per tablet Take 2 Tablets by mouth every day. (Patient not taking: Reported on 2/12/2025) 180 Tablet 3     No current facility-administered medications on file prior to visit.       Allergies:   Allergies   Allergen Reactions    Nsaids Unspecified     Due to renal disease       Social Hx:   Social History     Socioeconomic History    Marital status: Single     Spouse name: Not on file    Number of children: Not on file    Years of education: Not on file    Highest education level: Not on file   Occupational History    Not on file   Tobacco Use    Smoking status: Never    Smokeless tobacco: Never   Vaping Use    Vaping status: Never Used   Substance and Sexual Activity    Alcohol use: Not Currently    Drug use: Never    Sexual activity: Not on file   Other Topics Concern    Not on file  "  Social History Narrative    Not on file     Social Drivers of Health     Financial Resource Strain: Not on file   Food Insecurity: Not on file   Transportation Needs: Not on file   Physical Activity: Not on file   Stress: Not on file   Social Connections: Not on file   Intimate Partner Violence: Not on file   Housing Stability: Not on file         EXAMINATION     Physical Exam:   Vitals: BP (!) 153/83 (BP Location: Right arm, Patient Position: Sitting, BP Cuff Size: Adult)   Pulse 88   Temp 36.3 °C (97.3 °F) (Temporal)   Ht 1.778 m (5' 10\")   Wt 97.5 kg (215 lb)   SpO2 97%       Constitutional:   Body Habitus: Body mass index is 30.85 kg/m².  Cooperation: Fully cooperates with exam  Appearance: Well-groomed, well-nourished  Respiratory:  Breathing comfortable on room air, no audible wheezing  Cardiovascular: Skin appears well-perfused  Psychiatric: Appropriate affect  Gait: seated in manual wheelchair, able to rise with difficulty and take several steps with single point forearm cane in right arm    Neurologic:  Strength:    Right LE 4/5 in hip flexors (pain-limited), left LE 5/5 in hip flexors, bilateral LE 5/5 in knee extensors and flexors (painful on left), ankle dorsiflexors and plantarflexors  MSK:?Unable to fully extend left>right knee, no erythema or warmth of bilateral knees      MEDICAL DECISION MAKING    DATA    Labs:   Lab Results   Component Value Date/Time    SODIUM 140 11/20/2024 10:30 AM    POTASSIUM 4.3 11/20/2024 10:30 AM    CHLORIDE 105 11/20/2024 10:30 AM    CO2 20 11/20/2024 10:30 AM    GLUCOSE 60 (L) 11/20/2024 10:30 AM    BUN 38 (H) 11/20/2024 10:30 AM    CREATININE 1.92 (H) 11/20/2024 10:30 AM        Lab Results   Component Value Date/Time    WBC 9.5 11/20/2024 10:30 AM    RBC 4.41 (L) 11/20/2024 10:30 AM    HEMOGLOBIN 12.8 (L) 11/20/2024 10:30 AM    HEMATOCRIT 39.2 (L) 11/20/2024 10:30 AM    MCV 88.9 11/20/2024 10:30 AM    MCH 29.0 11/20/2024 10:30 AM    [facility] 32.7 11/20/2024 10:30 AM "    MPV 11.3 11/20/2024 10:30 AM    NEUTSPOLYS 62.4 05/11/2022 10:33 AM    LYMPHOCYTES 22.1 05/11/2022 10:33 AM    MONOCYTES 9.8 05/11/2022 10:33 AM    EOSINOPHILS 4.7 05/11/2022 10:33 AM    BASOPHILS 1.0 05/11/2022 10:33 AM        Lab Results   Component Value Date/Time    HBA1C 5.4 11/20/2024 09:30 AM          Imaging:   IMAGING radiology reads: I reviewed the following radiology reports  X-ray left knee 10/3/23: Findings:     Left knee: Mild narrowing medial joint space compartment. Subcortical   lucency along the weightbearing portion of the medial femoral condyle.   Small marginal osteophytes. Mild narrowing of the lateral   patellofemoral joint. Small osteophytes along the margins of the   patella and trochlear groove.     Moderate size joint effusion.     Impression:   1. Joint effusion.     2. Tricompartmental degenerative changes most marked in the medial   joint space compartment.     3. Possible osteochondral abnormality on the weightbearing portion   medial femoral condyle.                                                         X-ray right knee 10/3/23:  Findings:       Right knee: Mild narrowing medial joint space compartment with small   marginal osteophytes.     Osteophytes along the tibial spines. Mild narrowing lateral   patellofemoral joint with osteophytes along the patella and trochlear   groove. No significant joint effusion.       Impression:     Mild tricompartmental degenerative changes.     KL Osteoarthritis Score: 3          X-ray bilateral hips 10/3/23:  Findings: No acute fracture.     Acetabular joint spaces preserved.     Mild hypertrophic changes superior lateral margin of the right left   acetabulum.     SI joints normal.     Postoperative and degenerative changes lower lumbar spine.     Impression:     Mild degenerative changes acetabular joint spaces, similar to prior   exam.         DIAGNOSIS   Visit Diagnoses     ICD-10-CM   1. Right hip pain  M25.551   2. Primary osteoarthritis  of right hip  M16.11   3. Chronic pain of left knee  M25.562    G89.29   4. Primary osteoarthritis of left knee  M17.12         ASSESSMENT and PLAN:     Venkatesh Aldrich is a 79 y.o. male who returns to clinic for follow-up of left knee and right hip pain.    Venkatesh was seen today for follow-up.    Diagnoses and all orders for this visit:    Right hip pain  -     DX-HIP-COMPLETE - UNILATERAL 2+ RIGHT; Future    Primary osteoarthritis of right hip    Chronic pain of left knee    Primary osteoarthritis of left knee        Assessment & Plan  Acute right hip pain  Right hip osteoarthritis  Patient reports two weeks of severe right groin pain without radiation without specific fall or injury. He is able to bear weight on the leg but does have severe pain with resisted right hip flexion. The patient's ability to bear weight on the affected hip suggests a low likelihood of a fracture. However, given the severity of the pain during movement, further investigation is warranted. An updated x-ray of the right hip will be ordered to rule out any unexpected fractures. He has been advised to limit his intake of ibuprofen to once every 1 to 2 months, given recent GFR of 35. If the x-ray does not reveal any new concerning findings, a discussion regarding a potential steroid injection into the hip joint will be initiated.    Left knee pain  Left knee osteoarthritis  Patient has chronic severe left knee pain in the setting of osteoarthritis which was unfortunately not improved with left knee genicular nerve block on 1/22/25. Given lack of improvement with steroid injection and genicular nerve block, we discussed that unfortunately there are not additional targeted interventions I can offer other. He has been advised to consider consulting with a surgeon regarding the possibility of a knee replacement. He prefers to wait and discuss this option with his son before making a decision. We also discussed the option of pain medications  for his pain, unfortunately he cannot have NSAIDs in the setting of his CKD3 and has tried multiple opioid pain medications including Percocet, Norco, oxycodone, oxycontin, hydrocodone which have all been either ineffective or caused side effects. For now he will continue gabapentin 300 mg nightly.          Follow up: In 1-2 weeks after right hip x-ray    Thank you for allowing me to participate in the care of this patient. If you have any questions please not hesitate to contact me.         Please note that this dictation was created using voice recognition software. I have made every reasonable attempt to correct obvious errors but there may be errors of grammar and content that I may have overlooked prior to finalization of this note.    Nicky Valdivia MD  Interventional Spine and Sports Physiatry  Physical Medicine and Rehabilitation  [facility]

## 2025-02-12 ENCOUNTER — OFFICE VISIT (OUTPATIENT)
Dept: PHYSICAL MEDICINE AND REHAB | Facility: MEDICAL CENTER | Age: 80
End: 2025-02-12
Payer: MEDICARE

## 2025-02-12 VITALS
OXYGEN SATURATION: 97 % | SYSTOLIC BLOOD PRESSURE: 153 MMHG | HEIGHT: 70 IN | HEART RATE: 88 BPM | WEIGHT: 215 LBS | BODY MASS INDEX: 30.78 KG/M2 | TEMPERATURE: 97.3 F | DIASTOLIC BLOOD PRESSURE: 83 MMHG

## 2025-02-12 DIAGNOSIS — G89.29 CHRONIC PAIN OF LEFT KNEE: ICD-10-CM

## 2025-02-12 DIAGNOSIS — M25.551 RIGHT HIP PAIN: Primary | ICD-10-CM

## 2025-02-12 DIAGNOSIS — M17.12 PRIMARY OSTEOARTHRITIS OF LEFT KNEE: ICD-10-CM

## 2025-02-12 DIAGNOSIS — M25.562 CHRONIC PAIN OF LEFT KNEE: ICD-10-CM

## 2025-02-12 DIAGNOSIS — M16.11 PRIMARY OSTEOARTHRITIS OF RIGHT HIP: ICD-10-CM

## 2025-02-12 PROCEDURE — 3077F SYST BP >= 140 MM HG: CPT | Performed by: STUDENT IN AN ORGANIZED HEALTH CARE EDUCATION/TRAINING PROGRAM

## 2025-02-12 PROCEDURE — 3079F DIAST BP 80-89 MM HG: CPT | Performed by: STUDENT IN AN ORGANIZED HEALTH CARE EDUCATION/TRAINING PROGRAM

## 2025-02-12 PROCEDURE — 99214 OFFICE O/P EST MOD 30 MIN: CPT | Performed by: STUDENT IN AN ORGANIZED HEALTH CARE EDUCATION/TRAINING PROGRAM

## 2025-02-12 PROCEDURE — 1125F AMNT PAIN NOTED PAIN PRSNT: CPT | Performed by: STUDENT IN AN ORGANIZED HEALTH CARE EDUCATION/TRAINING PROGRAM

## 2025-02-12 ASSESSMENT — FIBROSIS 4 INDEX: FIB4 SCORE: 1.82

## 2025-02-12 ASSESSMENT — PAIN SCALES - GENERAL: PAINLEVEL_OUTOF10: 9=SEVERE PAIN

## 2025-02-12 ASSESSMENT — PATIENT HEALTH QUESTIONNAIRE - PHQ9: CLINICAL INTERPRETATION OF PHQ2 SCORE: 0

## 2025-02-20 ENCOUNTER — HOSPITAL ENCOUNTER (OUTPATIENT)
Dept: RADIOLOGY | Facility: MEDICAL CENTER | Age: 80
End: 2025-02-20
Attending: STUDENT IN AN ORGANIZED HEALTH CARE EDUCATION/TRAINING PROGRAM
Payer: MEDICARE

## 2025-02-20 ENCOUNTER — OFFICE VISIT (OUTPATIENT)
Dept: MEDICAL GROUP | Facility: MEDICAL CENTER | Age: 80
End: 2025-02-20
Payer: MEDICARE

## 2025-02-20 VITALS
TEMPERATURE: 97 F | OXYGEN SATURATION: 98 % | HEART RATE: 79 BPM | DIASTOLIC BLOOD PRESSURE: 70 MMHG | HEIGHT: 70 IN | WEIGHT: 197.8 LBS | SYSTOLIC BLOOD PRESSURE: 110 MMHG | BODY MASS INDEX: 28.32 KG/M2

## 2025-02-20 DIAGNOSIS — M25.552 CHRONIC PAIN OF BOTH HIPS: ICD-10-CM

## 2025-02-20 DIAGNOSIS — I10 PRIMARY HYPERTENSION: ICD-10-CM

## 2025-02-20 DIAGNOSIS — M25.551 CHRONIC PAIN OF BOTH HIPS: ICD-10-CM

## 2025-02-20 DIAGNOSIS — G89.29 CHRONIC PAIN OF BOTH HIPS: ICD-10-CM

## 2025-02-20 DIAGNOSIS — G89.29 BILATERAL CHRONIC KNEE PAIN: ICD-10-CM

## 2025-02-20 DIAGNOSIS — M25.561 BILATERAL CHRONIC KNEE PAIN: ICD-10-CM

## 2025-02-20 DIAGNOSIS — M25.562 BILATERAL CHRONIC KNEE PAIN: ICD-10-CM

## 2025-02-20 DIAGNOSIS — M16.11 ARTHRITIS OF RIGHT HIP: ICD-10-CM

## 2025-02-20 DIAGNOSIS — Z74.09 IMPAIRED MOBILITY: ICD-10-CM

## 2025-02-20 DIAGNOSIS — M25.551 RIGHT HIP PAIN: ICD-10-CM

## 2025-02-20 DIAGNOSIS — M19.90 ARTHRITIS: ICD-10-CM

## 2025-02-20 DIAGNOSIS — E11.65 TYPE 2 DIABETES MELLITUS WITH HYPERGLYCEMIA, WITHOUT LONG-TERM CURRENT USE OF INSULIN (HCC): ICD-10-CM

## 2025-02-20 PROBLEM — C44.91 BASAL CELL CARCINOMA OF SKIN: Status: ACTIVE | Noted: 2022-11-07

## 2025-02-20 PROCEDURE — 73502 X-RAY EXAM HIP UNI 2-3 VIEWS: CPT | Mod: RT

## 2025-02-20 PROCEDURE — 3074F SYST BP LT 130 MM HG: CPT

## 2025-02-20 PROCEDURE — 99214 OFFICE O/P EST MOD 30 MIN: CPT

## 2025-02-20 PROCEDURE — 3078F DIAST BP <80 MM HG: CPT

## 2025-02-20 ASSESSMENT — ENCOUNTER SYMPTOMS
PALPITATIONS: 0
COUGH: 0
ORTHOPNEA: 0
CHILLS: 0
SHORTNESS OF BREATH: 0
FEVER: 0

## 2025-02-20 ASSESSMENT — FIBROSIS 4 INDEX: FIB4 SCORE: 1.82

## 2025-02-20 NOTE — PROGRESS NOTES
Subjective:     Verbal consent was acquired by the patient to use [employer] ambient listening note generation during this visit Yes    CC: Follow-Up      HPI:   Venkatesh is a 79 y.o. male who presents today for:    History of Present Illness  The patient presents for evaluation of blood pressure management and right hip pain.    He reports that his blood pressure was found to be significantly low during a home health visit in December, leading to the discontinuation of his lisinopril-HCTZ. However, subsequent visits with Dr. Valdivia revealed elevated blood pressure readings (150/70s). He is currently contemplating whether to resume his antihypertensive therapy, and is seeking guidance. He also mentions that he has been using a wrist blood pressure cuff for home monitoring, and his levels have been normal.    He recently underwent an x-ray of his right hip to rule out a fracture this morning. He has a scheduled follow-up appointment with Dr. Valdivia on 02/25/2025 to discuss the results and treatment options. Additionally, he expresses concern about the termination of his home health services after a 60-day period, which he found beneficial, particularly for assistance with weekly showers due to his inability to perform this task independently. He is seeking clarification on whether Medicare will cover the continuation of these services, as he can not afford them out-of-pocket.              Allergies: Nsaids     Medications:   Current Outpatient Medications:     cyclobenzaprine (FLEXERIL) 5 mg tablet, Take 0.5 Tablets by mouth 3 times a day as needed for Mild Pain, Moderate Pain or Muscle Spasms., Disp: 90 Tablet, Rfl: 1    traZODone (DESYREL) 150 MG Tab, Take 1 Tablet by mouth every evening., Disp: 90 Tablet, Rfl: 3    prazosin (MINIPRESS) 1 MG Cap, Take 1 Capsule by mouth every evening., Disp: 90 Capsule, Rfl: 3    pravastatin (PRAVACHOL) 20 MG Tab, Take 1 Tablet by mouth every evening., Disp: 90 Tablet, Rfl: 3     "metFORMIN (GLUCOPHAGE) 500 MG Tab, Take 1 Tablet by mouth 2 times a day with meals., Disp: 180 Tablet, Rfl: 3    glipiZIDE (GLUCOTROL) 5 MG Tab, Take 2 Tablets by mouth 2 times a day., Disp: 360 Tablet, Rfl: 3    gabapentin (NEURONTIN) 300 MG Cap, Take 1 Capsule by mouth every evening., Disp: 90 Capsule, Rfl: 3      ROS:  Review of Systems   Constitutional:  Negative for chills and fever.   Respiratory:  Negative for cough and shortness of breath.    Cardiovascular:  Negative for chest pain, palpitations, orthopnea and leg swelling.       Objective:     Exam:  /62   Pulse 79   Temp 36.1 °C (97 °F) (Temporal)   Ht 1.778 m (5' 10\")   Wt 89.7 kg (197 lb 12.8 oz)   SpO2 98%   BMI 28.38 kg/m²  Body mass index is 28.38 kg/m².    Physical Exam  Constitutional:       Appearance: He is normal weight.   Eyes:      Pupils: Pupils are equal, round, and reactive to light.   Cardiovascular:      Rate and Rhythm: Normal rate and regular rhythm.      Pulses: Normal pulses.      Heart sounds: Normal heart sounds.   Pulmonary:      Effort: Pulmonary effort is normal.      Breath sounds: Normal breath sounds.   Neurological:      Mental Status: He is alert and oriented to person, place, and time.   Psychiatric:         Mood and Affect: Mood normal.         Behavior: Behavior normal.           Assessment & Plan:     Venkatesh a 79 y.o. male with the following -     Assessment & Plan     1. Primary hypertension  Chronic, stable  His blood pressure readings have been inconsistent, with normal levels during our last encounter but elevated readings during his consultations with Dr. Valdivia. Today's reading was 110/70, indicating that antihypertensive medication may not be necessary at this time. He is advised to continue monitoring his blood pressure at home using a wrist cuff, ensuring he is seated comfortably for a few minutes prior to each reading and holding the cuff at chest level for optimal accuracy.    2. Impaired " mobility  3. Arthritis of right hip  4. Chronic pain of both hips  Chronic, stable  A request will be submitted to insurance for the continuation of home health aide services to assist with weekly showers, as this has been beneficial for his hygiene and overall well-being.    Recent x-ray results revealed mild arthritis in the right hip, but no evidence of any acute fractures. He is scheduled for a follow-up appointment with Dr. Valdivia on 06/25/2025 to discuss potential treatment options for his hip condition.  - Referral to Home Health        Anticipatory guidance included the following: Patient counseled about skin care, diet, supplements, smoking, drugs/alcohol use, safe sex and exercise.     Return in about 4 months (around 6/20/2025).    Please note that this dictation was created using voice recognition software. I have made every reasonable attempt to correct obvious errors, but I expect that there are errors of grammar and possibly content that I did not discover before finalizing the note.

## 2025-02-25 ENCOUNTER — OFFICE VISIT (OUTPATIENT)
Dept: PHYSICAL MEDICINE AND REHAB | Facility: MEDICAL CENTER | Age: 80
End: 2025-02-25
Payer: MEDICARE

## 2025-02-25 VITALS
HEART RATE: 85 BPM | WEIGHT: 197 LBS | DIASTOLIC BLOOD PRESSURE: 69 MMHG | OXYGEN SATURATION: 98 % | HEIGHT: 70 IN | BODY MASS INDEX: 28.2 KG/M2 | SYSTOLIC BLOOD PRESSURE: 128 MMHG | TEMPERATURE: 98 F

## 2025-02-25 DIAGNOSIS — M17.12 PRIMARY OSTEOARTHRITIS OF LEFT KNEE: ICD-10-CM

## 2025-02-25 DIAGNOSIS — M16.11 PRIMARY OSTEOARTHRITIS OF RIGHT HIP: Primary | ICD-10-CM

## 2025-02-25 DIAGNOSIS — M25.551 RIGHT HIP PAIN: ICD-10-CM

## 2025-02-25 PROCEDURE — 3078F DIAST BP <80 MM HG: CPT | Performed by: STUDENT IN AN ORGANIZED HEALTH CARE EDUCATION/TRAINING PROGRAM

## 2025-02-25 PROCEDURE — 1125F AMNT PAIN NOTED PAIN PRSNT: CPT | Performed by: STUDENT IN AN ORGANIZED HEALTH CARE EDUCATION/TRAINING PROGRAM

## 2025-02-25 PROCEDURE — 99214 OFFICE O/P EST MOD 30 MIN: CPT | Performed by: STUDENT IN AN ORGANIZED HEALTH CARE EDUCATION/TRAINING PROGRAM

## 2025-02-25 PROCEDURE — 3074F SYST BP LT 130 MM HG: CPT | Performed by: STUDENT IN AN ORGANIZED HEALTH CARE EDUCATION/TRAINING PROGRAM

## 2025-02-25 ASSESSMENT — PAIN SCALES - GENERAL: PAINLEVEL_OUTOF10: 8=MODERATE-SEVERE PAIN

## 2025-02-25 ASSESSMENT — PATIENT HEALTH QUESTIONNAIRE - PHQ9: CLINICAL INTERPRETATION OF PHQ2 SCORE: 0

## 2025-02-25 ASSESSMENT — FIBROSIS 4 INDEX: FIB4 SCORE: 1.82

## 2025-02-25 NOTE — PROGRESS NOTES
Verbal consent was acquired by the patient to use [employer] ambient listening note generation during this visit Yes     FOLLOW-UP PATIENT VISIT    Interventional Spine and Pain  Physiatry (Physical Medicine and Rehabilitation)     Date of Service: 25   Chief Complaint:   Chief Complaint   Patient presents with    Follow-Up     Right hip pain      Patient Name: Venkatesh Aldrich   : 1945   MRN: [id number]       PRIOR HISTORY    Please see new patient note by Dr. Valdivia for more details.     Interval History  Patient identification: Venkatesh Aldrich,  1945, with history of T2DM, CKD3, HTN, BUTCH, L4-5 fusion in , who presents today for follow-up of right hip pain.    History of Present Illness  The patient is a 79-year-old male who presents today for follow-up of right hip, left knee, and bilateral shoulder pain.    He reports no significant changes in his condition over the past 2 weeks. He has been experiencing persistent pain in his right hip, left knee, and both shoulders. He had a severe flare-up of his right hip pain approximately 7 to 8 days ago, which he managed with over-the-counter ibuprofen, initially taking four 200 mg tablets, then reducing the dosage to two 200 mg tablets during the night. This regimen significantly alleviated his pain and the severe pain has resolved and is left with a baseline right groin pain that is manageable. He plans to further reduce the dosage to one 200 mg tablet for future flare-ups. He has expressed interest in exploring surgical options for left knee replacement and wishes to at some point consult with a surgeon regarding the potential benefits and recovery process associated with this procedure. He has declined the offer of a right hip injection at this time but remains open to reconsidering if his condition deteriorates and the right hip pain worsens. He has previously received a left hip injection, which unfortunately did not yield the  desired relief. For now he continues to take gabapentin 300 mg nightly and Flexeril 2.5 mg on occasion.      Procedures:  1/22/25: Left knee genicular nerve block, no improvement      PMHx:   Past Medical History:   Diagnosis Date    Type 2 diabetes mellitus (HCC)        PSHx:   Past Surgical History:   Procedure Laterality Date    [place] INJ AA/STRD GNCLR NRV BRNCH W/IMG Left 1/22/2025    Procedure: LEFT knee genicular nerve block fluoroscopic guided;  Surgeon: Shahab Bro M.D.;  [facility];  Service: Pain Management    LUMBAR FUSION POSTERIOR PERCUTANEOUS      L4-L5       Family history   History reviewed. No pertinent family history.    Medications:   Outpatient Medications Marked as Taking for the 2/25/25 encounter (Office Visit) with Nicky Valdivia M.D.   Medication Sig Dispense Refill    cyclobenzaprine (FLEXERIL) 5 mg tablet Take 0.5 Tablets by mouth 3 times a day as needed for Mild Pain, Moderate Pain or Muscle Spasms. 90 Tablet 1    traZODone (DESYREL) 150 MG Tab Take 1 Tablet by mouth every evening. 90 Tablet 3    prazosin (MINIPRESS) 1 MG Cap Take 1 Capsule by mouth every evening. 90 Capsule 3    pravastatin (PRAVACHOL) 20 MG Tab Take 1 Tablet by mouth every evening. 90 Tablet 3    metFORMIN (GLUCOPHAGE) 500 MG Tab Take 1 Tablet by mouth 2 times a day with meals. 180 Tablet 3    glipiZIDE (GLUCOTROL) 5 MG Tab Take 2 Tablets by mouth 2 times a day. 360 Tablet 3    gabapentin (NEURONTIN) 300 MG Cap Take 1 Capsule by mouth every evening. 90 Capsule 3        Current Outpatient Medications on File Prior to Visit   Medication Sig Dispense Refill    cyclobenzaprine (FLEXERIL) 5 mg tablet Take 0.5 Tablets by mouth 3 times a day as needed for Mild Pain, Moderate Pain or Muscle Spasms. 90 Tablet 1    traZODone (DESYREL) 150 MG Tab Take 1 Tablet by mouth every evening. 90 Tablet 3    prazosin (MINIPRESS) 1 MG Cap Take 1 Capsule by mouth every evening. 90 Capsule 3    pravastatin  "(PRAVACHOL) 20 MG Tab Take 1 Tablet by mouth every evening. 90 Tablet 3    metFORMIN (GLUCOPHAGE) 500 MG Tab Take 1 Tablet by mouth 2 times a day with meals. 180 Tablet 3    glipiZIDE (GLUCOTROL) 5 MG Tab Take 2 Tablets by mouth 2 times a day. 360 Tablet 3    gabapentin (NEURONTIN) 300 MG Cap Take 1 Capsule by mouth every evening. 90 Capsule 3     No current facility-administered medications on file prior to visit.       Allergies:   Allergies   Allergen Reactions    Ibuprofen Unspecified     Other Reaction(s): Other (See Comments)    Kidney problem    Nsaids Unspecified     Due to renal disease       Social Hx:   Social History     Socioeconomic History    Marital status: Single     Spouse name: Not on file    Number of children: Not on file    Years of education: Not on file    Highest education level: Not on file   Occupational History    Not on file   Tobacco Use    Smoking status: Never    Smokeless tobacco: Never   Vaping Use    Vaping status: Never Used   Substance and Sexual Activity    Alcohol use: Not Currently    Drug use: Never    Sexual activity: Not on file   Other Topics Concern    Not on file   Social History Narrative    Not on file     Social Drivers of Health     Financial Resource Strain: Not on file   Food Insecurity: Not on file   Transportation Needs: Not on file   Physical Activity: Not on file   Stress: Not on file   Social Connections: Not on file   Intimate Partner Violence: Not on file   Housing Stability: Not on file         EXAMINATION     Physical Exam:   Vitals: /69 (BP Location: Right arm, Patient Position: Sitting, BP Cuff Size: Adult)   Pulse 85   Temp 36.7 °C (98 °F) (Temporal)   Ht 1.778 m (5' 10\")   Wt 89.4 kg (197 lb)   SpO2 98%       Constitutional:   Body Habitus: Body mass index is 28.27 kg/m².  Cooperation: Fully cooperates with exam  Appearance: Well-groomed, well-nourished  Respiratory:  Breathing comfortable on room air, no audible wheezing  Cardiovascular: " Skin appears well-perfused  Psychiatric: Appropriate affect  Gait: seated in manual wheelchair      MEDICAL DECISION MAKING    DATA    Labs:   Lab Results   Component Value Date/Time    SODIUM 140 11/20/2024 10:30 AM    POTASSIUM 4.3 11/20/2024 10:30 AM    CHLORIDE 105 11/20/2024 10:30 AM    CO2 20 11/20/2024 10:30 AM    GLUCOSE 60 (L) 11/20/2024 10:30 AM    BUN 38 (H) 11/20/2024 10:30 AM    CREATININE 1.92 (H) 11/20/2024 10:30 AM        Lab Results   Component Value Date/Time    WBC 9.5 11/20/2024 10:30 AM    RBC 4.41 (L) 11/20/2024 10:30 AM    HEMOGLOBIN 12.8 (L) 11/20/2024 10:30 AM    HEMATOCRIT 39.2 (L) 11/20/2024 10:30 AM    MCV 88.9 11/20/2024 10:30 AM    MCH 29.0 11/20/2024 10:30 AM    MCHC 32.7 11/20/2024 10:30 AM    MPV 11.3 11/20/2024 10:30 AM    NEUTSPOLYS 62.4 05/11/2022 10:33 AM    LYMPHOCYTES 22.1 05/11/2022 10:33 AM    MONOCYTES 9.8 05/11/2022 10:33 AM    EOSINOPHILS 4.7 05/11/2022 10:33 AM    BASOPHILS 1.0 05/11/2022 10:33 AM        Lab Results   Component Value Date/Time    HBA1C 5.4 11/20/2024 09:30 AM        Imaging:   I personally reviewed the following images, below are my independent reads:  Right hip x-ray 2/20/25: Mild joint space narrowing and osteophytes consistent with osteoarthritis.        I reviewed the following radiology reports  Right hip x-ray 2/20/25:  FINDINGS:  Bone mineralization is age appropriate. Bony alignment is anatomic. There are mild osteoarthritic degenerative changes of the hips with osteophytic spurring. There is no acute fracture or malalignment.     IMPRESSION:     Mild osteoarthritic degenerative changes of the hips without acute fracture or malalignment.      DIAGNOSIS   Visit Diagnoses     ICD-10-CM   1. Primary osteoarthritis of right hip  M16.11   2. Right hip pain  M25.551   3. Primary osteoarthritis of left knee  M17.12         ASSESSMENT and PLAN:     Venkatesh Aldrich is a 79 y.o. male who returns to clinic for follow-up of right hip and left knee  pain.    Venkatesh was seen today for follow-up.    Diagnoses and all orders for this visit:    Primary osteoarthritis of right hip    Right hip pain    Primary osteoarthritis of left knee        Assessment & Plan  Right hip pain  Right hip osteoarthritis  The patient's x-ray results indicate mild to moderate arthritis in the right hip, with no evidence of fractures. He has previously tried opioids without success and has kidney disease, limiting medication options. A steroid injection for the right hip was discussed as a potential treatment option, but he has decided to defer this for now as he has had improvement in his right hip pain flare, and pain is back to a baseline manageable level. If the pain worsens and does not improve with limited ibuprofen, the injection may be reconsidered. We discussed the importance of minimizing ibuprofen as any amount of NSAID is risky for him given his CKD.    Left knee pain  Left knee osteoarthritis  Patient has chronic severe left knee pain which is unfortunately refractory to steroid injections and left knee genicular nerve block. The patient has arthritis in the left knee. A referral to a surgeon for a consultation regarding knee replacement surgery was discussed. He is interested in understanding the surgery and recovery process, including short-term rehab covered by Medicare. He will drop a note when he decides to proceed with the consultation and I will put in a referral to a surgeon at that point.          Follow up: As needed    Thank you for allowing me to participate in the care of this patient. If you have any questions please not hesitate to contact me.         Please note that this dictation was created using voice recognition software. I have made every reasonable attempt to correct obvious errors but there may be errors of grammar and content that I may have overlooked prior to finalization of this note.    Nicky Valdivia MD  Interventional Spine and Sports  Physiatry  Physical Medicine and Rehabilitation  [facility]

## 2025-03-20 ENCOUNTER — TELEPHONE (OUTPATIENT)
Dept: MEDICAL GROUP | Facility: MEDICAL CENTER | Age: 80
End: 2025-03-20
Payer: MEDICARE

## 2025-03-20 NOTE — TELEPHONE ENCOUNTER
Received fax from Home Health stating pt refused help with showering aid and refused continuation of care with Home Health. Fax scanned into media.